# Patient Record
Sex: FEMALE | Race: WHITE | NOT HISPANIC OR LATINO | URBAN - METROPOLITAN AREA
[De-identification: names, ages, dates, MRNs, and addresses within clinical notes are randomized per-mention and may not be internally consistent; named-entity substitution may affect disease eponyms.]

---

## 2021-11-22 ENCOUNTER — OFFICE VISIT (OUTPATIENT)
Dept: URBAN - METROPOLITAN AREA CLINIC 63 | Facility: CLINIC | Age: 61
End: 2021-11-22

## 2021-12-13 ENCOUNTER — OFFICE VISIT (OUTPATIENT)
Dept: URBAN - METROPOLITAN AREA SURGERY CENTER 4 | Facility: SURGERY CENTER | Age: 61
End: 2021-12-13

## 2021-12-27 ENCOUNTER — OFFICE VISIT (OUTPATIENT)
Dept: URBAN - METROPOLITAN AREA CLINIC 63 | Facility: CLINIC | Age: 61
End: 2021-12-27

## 2022-01-10 ENCOUNTER — OFFICE VISIT (OUTPATIENT)
Dept: URBAN - METROPOLITAN AREA SURGERY CENTER 4 | Facility: SURGERY CENTER | Age: 62
End: 2022-01-10

## 2022-01-12 LAB — PATHOLOGY (INDENTED REPORT): (no result)

## 2022-01-26 ENCOUNTER — OFFICE VISIT (OUTPATIENT)
Dept: URBAN - METROPOLITAN AREA CLINIC 63 | Facility: CLINIC | Age: 62
End: 2022-01-26

## 2022-07-09 ENCOUNTER — TELEPHONE ENCOUNTER (OUTPATIENT)
Dept: URBAN - METROPOLITAN AREA CLINIC 121 | Facility: CLINIC | Age: 62
End: 2022-07-09

## 2022-07-09 RX ORDER — ROSUVASTATIN CALCIUM 5 MG/1
TABLET, FILM COATED ORAL ONCE A DAY
Refills: 0 | OUTPATIENT
Start: 2021-11-22 | End: 2022-01-26

## 2022-07-09 RX ORDER — PANTOPRAZOLE SODIUM 40 MG/1
TABLET, DELAYED RELEASE ORAL
Refills: 0 | OUTPATIENT
Start: 2021-11-22 | End: 2022-01-26

## 2022-07-09 RX ORDER — DICYCLOMINE HYDROCHLORIDE 10 MG/1
CAPSULE ORAL ONCE A DAY
Refills: 0 | OUTPATIENT
Start: 2022-01-26 | End: 2022-01-26

## 2022-07-10 ENCOUNTER — TELEPHONE ENCOUNTER (OUTPATIENT)
Dept: URBAN - METROPOLITAN AREA CLINIC 121 | Facility: CLINIC | Age: 62
End: 2022-07-10

## 2022-07-10 RX ORDER — PANTOPRAZOLE SODIUM 40 MG/1
TABLET, DELAYED RELEASE ORAL
Refills: 0 | Status: ACTIVE | COMMUNITY
Start: 2022-01-26

## 2022-07-10 RX ORDER — ROSUVASTATIN CALCIUM 5 MG/1
TABLET, FILM COATED ORAL ONCE A DAY
Refills: 0 | Status: ACTIVE | COMMUNITY
Start: 2022-01-26

## 2024-08-20 ENCOUNTER — DASHBOARD ENCOUNTERS (OUTPATIENT)
Age: 64
End: 2024-08-20

## 2024-08-28 ENCOUNTER — OFFICE VISIT (OUTPATIENT)
Dept: URBAN - METROPOLITAN AREA CLINIC 63 | Facility: CLINIC | Age: 64
End: 2024-08-28

## 2024-08-28 RX ORDER — PANTOPRAZOLE SODIUM 40 MG/1
TABLET, DELAYED RELEASE ORAL
Refills: 0 | COMMUNITY
Start: 2022-01-26

## 2024-08-28 RX ORDER — ROSUVASTATIN CALCIUM 5 MG/1
TABLET, FILM COATED ORAL ONCE A DAY
Refills: 0 | COMMUNITY
Start: 2022-01-26

## 2024-11-15 ENCOUNTER — OFFICE VISIT (OUTPATIENT)
Dept: URBAN - METROPOLITAN AREA CLINIC 63 | Facility: CLINIC | Age: 64
End: 2024-11-15

## 2024-11-15 PROBLEM — 19850005: Status: ACTIVE | Noted: 2024-11-15

## 2024-11-15 PROBLEM — 266998003: Status: ACTIVE | Noted: 2024-11-15

## 2024-11-15 PROBLEM — 305058001: Status: ACTIVE | Noted: 2024-11-15

## 2024-11-15 RX ORDER — ROSUVASTATIN CALCIUM 5 MG/1
TABLET, FILM COATED ORAL ONCE A DAY
Refills: 0 | COMMUNITY
Start: 2022-01-26

## 2024-11-15 RX ORDER — PANTOPRAZOLE SODIUM 40 MG/1
TABLET, DELAYED RELEASE ORAL
Refills: 0 | COMMUNITY
Start: 2022-01-26

## 2024-11-15 NOTE — HPI-PREVIOUS IMAGING
CAT scan September 2021: Pneumoperitoneum with small amounts of free fluid seen within the abdomen and pelvis.  Suspected perforation of the pyloric ulcer lower lung fields noted infiltrate suggesting pneumonia.  Diverticulosis.  Cystic-appearing lesion within the left ovary.

## 2024-11-15 NOTE — HPI-PREVIOUS PROCEDURES
Upper endoscopy January 2022: LA grade A esophagitis biopsies negative for Kumari's, mild inflammation in the antrum biopsies negative for H. pylori. 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 Duodenum was unremarkable  No prior Colonoscopy- neg cologuard 2021

## 2024-11-15 NOTE — HPI-TODAY'S VISIT:
Danya is a pleasant 64-year-old female who was last seen in the office by [place] in January 2022 as a hospital follow-up. CAT scan apparently at the time (September 2021) noted perforation from an ulcer in the pylorus. She underwent exploratory laparotomy with omental patch repair of a perforated pyloric ulcer. At the time she was taking hefty doses of ibuprofen. Subsequent upper endoscopy January 2022 noted H. pylori negative gastritis and Kumari's negative esophagitis and a gastric deformity. She was treated at the time with pantoprazole 40 mg. COVID-positive 2021. No prior colonoscopies. Negative Cologuard around 2021. She declined colonoscopy.

## 2025-02-07 ENCOUNTER — OFFICE VISIT (OUTPATIENT)
Dept: URBAN - METROPOLITAN AREA CLINIC 63 | Facility: CLINIC | Age: 65
End: 2025-02-07

## 2025-02-07 RX ORDER — ROSUVASTATIN CALCIUM 5 MG/1
TABLET, FILM COATED ORAL ONCE A DAY
Refills: 0 | COMMUNITY
Start: 2022-01-26

## 2025-02-07 RX ORDER — PANTOPRAZOLE SODIUM 40 MG/1
TABLET, DELAYED RELEASE ORAL
Refills: 0 | COMMUNITY
Start: 2022-01-26

## 2025-02-07 NOTE — HPI-PREVIOUS PROCEDURES
Upper endoscopy January 2022: LA grade A esophagitis biopsies negative for Kumari's, mild inflammation in the antrum biopsies negative for H. pylori. 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 Duodenum was unremarkable  No prior Colonoscopy- neg cologuard oct 2024 - ( results on epic ) /neg cologuard 2021/

## 2025-02-07 NOTE — HPI-PREVIOUS LABS
Labs February 2025:  WBC 7.1, hemoglobin 14.3 g MCV 92 platelets 232, C-reactive protein less than 0.5 Normal electrolytes BUN 15 creatinine 0.73, serum albumin 4.1, normal liver enzymes, hepatitis C negative Vitamin D24.9 (L), vitamin B12 248, normal lipid panel  Cologuard negative in October 2024  Labs April 2024:  Normal lipid panel except for  (H), BUN 19 creatinine 0.9, normal electrolytes, serum albumin 4.3, total bilirubin 1.4, normal other liver enzymes, WBC 5.0 hemoglobin 15 g MCV 90 platelets 207, B12 307

## 2025-02-07 NOTE — HPI-PREVIOUS IMAGING
Upper GI series with air-contrast February 2022: Normal esophagus without stricture or ulceration or hiatal hernia. Normal gastric mucosa. No masses. Stomach readily empties into the duodenum. Pyloric channel is widely patent. Fullness in the duodenal bulb which may represent prominent Brunner's glands however other etiologies are not completely excluded. No obvious ulceration or diverticulum. Proximal small bowel unremarkable  CAT scan September 2021: Pneumoperitoneum with small amounts of free fluid seen within the abdomen and pelvis.  Suspected perforation of the pyloric ulcer lower lung fields noted infiltrate suggesting pneumonia.  Diverticulosis.  Cystic-appearing lesion within the left ovary.

## 2025-06-20 ENCOUNTER — OFFICE VISIT (OUTPATIENT)
Dept: URBAN - METROPOLITAN AREA CLINIC 63 | Facility: CLINIC | Age: 65
End: 2025-06-20